# Patient Record
Sex: FEMALE
[De-identification: names, ages, dates, MRNs, and addresses within clinical notes are randomized per-mention and may not be internally consistent; named-entity substitution may affect disease eponyms.]

---

## 2020-10-07 PROBLEM — Z00.00 ENCOUNTER FOR PREVENTIVE HEALTH EXAMINATION: Status: ACTIVE | Noted: 2020-10-07

## 2020-10-30 ENCOUNTER — APPOINTMENT (OUTPATIENT)
Dept: VASCULAR SURGERY | Facility: CLINIC | Age: 85
End: 2020-10-30
Payer: COMMERCIAL

## 2020-10-30 VITALS
HEART RATE: 82 BPM | DIASTOLIC BLOOD PRESSURE: 83 MMHG | WEIGHT: 167 LBS | HEIGHT: 61 IN | SYSTOLIC BLOOD PRESSURE: 218 MMHG | BODY MASS INDEX: 31.53 KG/M2

## 2020-10-30 VITALS — TEMPERATURE: 97.1 F

## 2020-10-30 DIAGNOSIS — Z86.19 PERSONAL HISTORY OF OTHER INFECTIOUS AND PARASITIC DISEASES: ICD-10-CM

## 2020-10-30 DIAGNOSIS — Z78.9 OTHER SPECIFIED HEALTH STATUS: ICD-10-CM

## 2020-10-30 DIAGNOSIS — Z86.39 PERSONAL HISTORY OF OTHER ENDOCRINE, NUTRITIONAL AND METABOLIC DISEASE: ICD-10-CM

## 2020-10-30 DIAGNOSIS — Z87.19 PERSONAL HISTORY OF OTHER DISEASES OF THE DIGESTIVE SYSTEM: ICD-10-CM

## 2020-10-30 DIAGNOSIS — H40.9 UNSPECIFIED GLAUCOMA: ICD-10-CM

## 2020-10-30 DIAGNOSIS — Z87.448 PERSONAL HISTORY OF OTHER DISEASES OF URINARY SYSTEM: ICD-10-CM

## 2020-10-30 DIAGNOSIS — Z87.898 PERSONAL HISTORY OF OTHER SPECIFIED CONDITIONS: ICD-10-CM

## 2020-10-30 DIAGNOSIS — I10 ESSENTIAL (PRIMARY) HYPERTENSION: ICD-10-CM

## 2020-10-30 PROCEDURE — 99203 OFFICE O/P NEW LOW 30 MIN: CPT

## 2020-10-30 PROCEDURE — 93924 LWR XTR VASC STDY BILAT: CPT

## 2020-10-30 PROCEDURE — 99072 ADDL SUPL MATRL&STAF TM PHE: CPT

## 2020-10-30 RX ORDER — LATANOPROST/PF 0.005 %
0.01 DROPS OPHTHALMIC (EYE)
Refills: 0 | Status: ACTIVE | COMMUNITY

## 2020-10-30 RX ORDER — DORZOLAMIDE HYDROCHLORIDE TIMOLOL MALEATE 20; 5 MG/ML; MG/ML
SOLUTION/ DROPS OPHTHALMIC
Refills: 0 | Status: ACTIVE | COMMUNITY

## 2020-10-30 RX ORDER — AMLODIPINE BESYLATE 5 MG/1
TABLET ORAL
Refills: 0 | Status: ACTIVE | COMMUNITY

## 2020-10-30 RX ORDER — CLOPIDOGREL BISULFATE 75 MG/1
75 TABLET, FILM COATED ORAL DAILY
Qty: 90 | Refills: 3 | Status: ACTIVE | COMMUNITY
Start: 2020-10-30 | End: 1900-01-01

## 2020-10-30 RX ORDER — ATORVASTATIN CALCIUM 80 MG/1
TABLET, FILM COATED ORAL
Refills: 0 | Status: ACTIVE | COMMUNITY

## 2020-10-30 NOTE — PHYSICAL EXAM
[2+] : left 2+ [0] : left 0 [No Rash or Lesion] : No rash or lesion [Alert] : alert [Calm] : calm [JVD] : no jugular venous distention  [Normal Breath Sounds] : Normal breath sounds [Normal Heart Sounds] : normal heart sounds [Ankle Swelling (On Exam)] : not present [Varicose Veins Of Lower Extremities] : not present [] : not present [Abdomen Masses] : No abdominal masses [Skin Ulcer] : no ulcer [de-identified] : appears well  [de-identified] : no calf tenderness

## 2020-10-30 NOTE — ASSESSMENT
[FreeTextEntry1] : 83 yo female with a history of dm, htn, hld, presents for evaluation of b/l lower extremity pain and fatigue when walking.  Disabling claudication of less than half a block.\par \par james/pvr shows evidence of severe arterial disease\par \par will arrange for lower extremity angiogram

## 2020-10-30 NOTE — HISTORY OF PRESENT ILLNESS
[FreeTextEntry1] : 85 yo female with a history of dm, htn, hld, presents for evaluation of b/l lower extremity pain and fatigue when walking.  pt states that she is able to walk about 1 block or 1 flight of stairs before she has to stop and rest secondary to lower extremity fatigue and sob.  pt denies any history of open wounds of the lower extremities.  pt states that she occasionally experiences cramping pain in the legs that wake her at night.  pt states that she has been experiencing these symptoms over the past year but have been worsening in severity

## 2020-10-30 NOTE — REVIEW OF SYSTEMS
[SOB on Exertion] : shortness of breath during exertion [As Noted in HPI] : as noted in HPI [Negative] : Neurological [Chest Pain] : no chest pain

## 2020-10-30 NOTE — CONSULT LETTER
[Dear  ___] : Dear  [unfilled], [Consult Letter:] : I had the pleasure of evaluating your patient, [unfilled]. [Please see my note below.] : Please see my note below. [Consult Closing:] : Thank you very much for allowing me to participate in the care of this patient.  If you have any questions, please do not hesitate to contact me. [Sincerely,] : Sincerely, [FreeTextEntry3] : Ed Musa M.D., F.PANCHITO., R.P.JOANNAI.  of Vascular Surgery Assistant Professor of Radiology Director of Endovascular Program/ [facility]

## 2020-11-08 DIAGNOSIS — Z01.818 ENCOUNTER FOR OTHER PREPROCEDURAL EXAMINATION: ICD-10-CM

## 2020-11-13 ENCOUNTER — APPOINTMENT (OUTPATIENT)
Dept: DISASTER EMERGENCY | Facility: CLINIC | Age: 85
End: 2020-11-13

## 2020-11-14 LAB — SARS-COV-2 N GENE NPH QL NAA+PROBE: NOT DETECTED

## 2020-11-17 ENCOUNTER — LABORATORY RESULT (OUTPATIENT)
Age: 85
End: 2020-11-17

## 2020-11-18 ENCOUNTER — RESULT REVIEW (OUTPATIENT)
Age: 85
End: 2020-11-18

## 2020-11-18 ENCOUNTER — APPOINTMENT (OUTPATIENT)
Dept: ENDOVASCULAR SURGERY | Facility: CLINIC | Age: 85
End: 2020-11-18
Payer: COMMERCIAL

## 2020-11-18 VITALS
WEIGHT: 167 LBS | RESPIRATION RATE: 16 BRPM | HEIGHT: 61 IN | OXYGEN SATURATION: 97 % | DIASTOLIC BLOOD PRESSURE: 77 MMHG | HEART RATE: 65 BPM | TEMPERATURE: 97.7 F | BODY MASS INDEX: 31.53 KG/M2 | SYSTOLIC BLOOD PRESSURE: 179 MMHG

## 2020-11-18 PROCEDURE — 37231Z: CUSTOM | Mod: 59,LT

## 2020-11-18 PROCEDURE — 37227Z: CUSTOM | Mod: LT

## 2020-11-18 PROCEDURE — 75625 CONTRAST EXAM ABDOMINL AORTA: CPT

## 2020-11-18 PROCEDURE — 37233Z: CUSTOM | Mod: 59,LT

## 2020-11-18 RX ORDER — LOSARTAN POTASSIUM 100 MG/1
100 TABLET, FILM COATED ORAL
Refills: 0 | Status: ACTIVE | COMMUNITY

## 2020-11-30 NOTE — PHYSICAL EXAM
[Normal Breath Sounds] : Normal breath sounds [Normal Heart Sounds] : normal heart sounds [2+] : left 2+ [0] : left 0 [No Rash or Lesion] : No rash or lesion [Alert] : alert [Calm] : calm [JVD] : no jugular venous distention  [Ankle Swelling (On Exam)] : not present [Varicose Veins Of Lower Extremities] : not present [] : not present [Abdomen Masses] : No abdominal masses [Skin Ulcer] : no ulcer [de-identified] : appears well  [de-identified] : no calf tenderness

## 2020-11-30 NOTE — PROCEDURE
[D/C IV on discharge] : D/C IV on discharge [Resume diet] : resume diet [FreeTextEntry1] : aortogram, right leg angiogram,ahterectomy, stent and angioplasty

## 2020-11-30 NOTE — PAST MEDICAL HISTORY
[Increasing age ( >40 years old)] : Increasing age ( >40 years old) [No therapy indicated for cases scheduled for less than one hour] : No therapy indicated for cases scheduled for less than one hour. [FreeTextEntry1] : Malignant Hyperthermia Screening Tool and Risk of Bleeding Assessment\par \par Ms. EMILY SCHULTE denies family history of unexpected death following Anesthesia or Exercise.\par Denies Family history of Malignant Hyperthermia, Muscle or Neuromuscular disorder and High Temperature following exercise.\par \par Ms. EMILY SCHULTE denies history of Muscle Spasm, Dark or Chocolate - Colored urine and Unanticipated fever immediately following anesthesia or serious exercise. \par Ms. SCHULTE also denies bleeding tendencies/ Risks of Bleeding.\par

## 2020-11-30 NOTE — HISTORY OF PRESENT ILLNESS
[FreeTextEntry1] : accompanied by friend Simon Enamorado [phone]\par covid not detected 11/13/2020\par Cr: 1.09 10/30/2020\par feels ok\par took plavix, aspirin and BP meds this morning [FreeTextEntry6] : Dr. Chapman

## 2020-12-04 ENCOUNTER — APPOINTMENT (OUTPATIENT)
Dept: VASCULAR SURGERY | Facility: CLINIC | Age: 85
End: 2020-12-04
Payer: COMMERCIAL

## 2020-12-04 VITALS — TEMPERATURE: 94.6 F

## 2020-12-04 PROCEDURE — 93926 LOWER EXTREMITY STUDY: CPT

## 2020-12-04 PROCEDURE — 99072 ADDL SUPL MATRL&STAF TM PHE: CPT

## 2020-12-04 PROCEDURE — 99213 OFFICE O/P EST LOW 20 MIN: CPT

## 2020-12-04 NOTE — ASSESSMENT
[FreeTextEntry1] : Patient with peripheral vascular disease.  Left SFA and popliteal stents are patent.  Left peroneal artery proximally is not visualized.  Continue antiplatelet therapy.  Follow-up next week for PVRs to further evaluate.

## 2020-12-04 NOTE — HISTORY OF PRESENT ILLNESS
[FreeTextEntry1] : Patient with peripheral vascular disease.  Status post left SFA and popliteal stent placement.  Patient reports swelling of the left leg .  No tissue loss.  Patient continues to have complains of left leg pain.  Patient believes right lower extremity symptoms have improved.

## 2020-12-11 ENCOUNTER — APPOINTMENT (OUTPATIENT)
Dept: VASCULAR SURGERY | Facility: CLINIC | Age: 85
End: 2020-12-11
Payer: MEDICARE

## 2020-12-11 PROCEDURE — 99213 OFFICE O/P EST LOW 20 MIN: CPT

## 2020-12-11 PROCEDURE — 93923 UPR/LXTR ART STDY 3+ LVLS: CPT

## 2020-12-11 PROCEDURE — 99072 ADDL SUPL MATRL&STAF TM PHE: CPT

## 2020-12-11 NOTE — HISTORY OF PRESENT ILLNESS
[FreeTextEntry1] : Patient with peripheral vascular disease.  Status post left SFA and popliteal stent placement.  Patient reports swelling of the left leg .  No tissue loss.  No complaint of left leg rest pain or claudication.\par \par Patient complains of less than 1 block right leg claudication.

## 2020-12-11 NOTE — PHYSICAL EXAM
[JVD] : no jugular venous distention  [Normal Breath Sounds] : Normal breath sounds [Normal Heart Sounds] : normal heart sounds [2+] : left 2+ [0] : left 0 [Ankle Swelling (On Exam)] : not present [Varicose Veins Of Lower Extremities] : not present [] : not present [Abdomen Masses] : No abdominal masses [No Rash or Lesion] : No rash or lesion [Skin Ulcer] : no ulcer [Alert] : alert [Calm] : calm [de-identified] : appears well  [de-identified] : no calf tenderness

## 2020-12-11 NOTE — ASSESSMENT
[FreeTextEntry1] : Patient with peripheral vascular disease.  Left SFA and popliteal stents are patent.  Left leg feels much better.  Plan for right leg angiogram and intervention

## 2020-12-26 ENCOUNTER — APPOINTMENT (OUTPATIENT)
Dept: DISASTER EMERGENCY | Facility: CLINIC | Age: 85
End: 2020-12-26

## 2020-12-26 LAB — SARS-COV-2 N GENE NPH QL NAA+PROBE: NOT DETECTED

## 2020-12-27 ENCOUNTER — APPOINTMENT (OUTPATIENT)
Dept: DISASTER EMERGENCY | Facility: CLINIC | Age: 85
End: 2020-12-27

## 2020-12-27 DIAGNOSIS — Z01.818 ENCOUNTER FOR OTHER PREPROCEDURAL EXAMINATION: ICD-10-CM

## 2020-12-30 ENCOUNTER — RESULT REVIEW (OUTPATIENT)
Age: 85
End: 2020-12-30

## 2020-12-30 ENCOUNTER — LABORATORY RESULT (OUTPATIENT)
Age: 85
End: 2020-12-30

## 2020-12-30 ENCOUNTER — APPOINTMENT (OUTPATIENT)
Dept: ENDOVASCULAR SURGERY | Facility: CLINIC | Age: 85
End: 2020-12-30
Payer: COMMERCIAL

## 2020-12-30 VITALS
RESPIRATION RATE: 16 BRPM | TEMPERATURE: 98.2 F | HEART RATE: 65 BPM | DIASTOLIC BLOOD PRESSURE: 80 MMHG | SYSTOLIC BLOOD PRESSURE: 202 MMHG | OXYGEN SATURATION: 100 % | BODY MASS INDEX: 31.53 KG/M2 | WEIGHT: 167 LBS | HEIGHT: 61 IN

## 2020-12-30 PROCEDURE — 37197Z: CUSTOM | Mod: 59,RT

## 2020-12-30 PROCEDURE — 75625 CONTRAST EXAM ABDOMINL AORTA: CPT

## 2020-12-30 PROCEDURE — 99072 ADDL SUPL MATRL&STAF TM PHE: CPT

## 2020-12-30 PROCEDURE — 37227Z: CUSTOM | Mod: RT

## 2021-01-11 ENCOUNTER — APPOINTMENT (OUTPATIENT)
Dept: VASCULAR SURGERY | Facility: CLINIC | Age: 86
End: 2021-01-11
Payer: COMMERCIAL

## 2021-01-11 VITALS — TEMPERATURE: 96.8 F

## 2021-01-11 PROCEDURE — 99072 ADDL SUPL MATRL&STAF TM PHE: CPT

## 2021-01-11 PROCEDURE — 99213 OFFICE O/P EST LOW 20 MIN: CPT

## 2021-01-11 PROCEDURE — 93926 LOWER EXTREMITY STUDY: CPT

## 2021-01-11 NOTE — HISTORY OF PRESENT ILLNESS
[FreeTextEntry1] : 84 yo female with a history of dm, htn, hld, presents for follow up of b/l lower extremity pain and fatigue when walking s/p left lower extremity angiogram with atherectomy and stent placement of the left sfa and pop s/p recent right lower extremity sfa and pop stent. pt reports right lower extremity edema with mild right calf pain \par

## 2021-01-11 NOTE — PHYSICAL EXAM
[No Rash or Lesion] : No rash or lesion [Alert] : alert [Calm] : calm [JVD] : no jugular venous distention  [Normal Breath Sounds] : Normal breath sounds [Normal Heart Sounds] : normal heart sounds [2+] : left 2+ [0] : left 0 [Ankle Swelling (On Exam)] : not present [Varicose Veins Of Lower Extremities] : not present [] : not present [Abdomen Masses] : No abdominal masses [Skin Ulcer] : no ulcer [de-identified] : appears well  [de-identified] : no calf tenderness

## 2021-01-11 NOTE — ASSESSMENT
[FreeTextEntry1] : 86 yo female with a history of dm, htn, hld, presents for follow up of b/l lower extremity pain and fatigue when walking s/p left lower extremity angiogram with atherectomy and stent placement of the left sfa and pop s/p recent right lower extremity sfa and pop stent.\par \par right sfa - pop stent patent with 20-50% stenosis of the distal pop and tibial peroneal trunk \par \par venous duplex shows no evidence of dvt/svt \par \par pt to elevate her lower extremities and encouraged exercise \par pt to continue with plavix and will follow up in 3 months with b/l stent duplex \par \par

## 2021-01-20 NOTE — PROCEDURE
[Groin check for bleeding/hematoma, vitals checked, and Doppler/pulse checked on admission, then every 15 minutes for an hour and every 30 minutes for 3 hours thereafter.] : Groin check for bleeding/hematoma, vitals checked, and Doppler/pulse checked on admission, then every 15 minutes for an hour and every 30 minutes for 3 hours thereafter.  [Keep flat for 2 hours, then elevate head gradually as tolerated] : Keep flat for 2 hours, then elevate head gradually as tolerated [Bed rest for 3.5 hours, then ambulate and observe site for bleeding] : Bed rest for 3.5 hours, then ambulate and observe site for bleeding [Resume Diet] : resume diet [D/C IV on discharge] : D/C IV on discharge [Resume diet] : resume diet [FreeTextEntry1] : aortogram,right leg angiogram/athrectomy/angioplasty

## 2021-01-20 NOTE — PHYSICAL EXAM
[Normal Breath Sounds] : Normal breath sounds [Normal Heart Sounds] : normal heart sounds [2+] : left 2+ [0] : left 0 [No Rash or Lesion] : No rash or lesion [Alert] : alert [Calm] : calm [JVD] : no jugular venous distention  [Ankle Swelling (On Exam)] : not present [Varicose Veins Of Lower Extremities] : not present [] : not present [Abdomen Masses] : No abdominal masses [Skin Ulcer] : no ulcer [de-identified] : appears well  [de-identified] : no calf tenderness

## 2021-01-20 NOTE — HISTORY OF PRESENT ILLNESS
[FreeTextEntry1] : accompanied by friend Simon Enamorado [phone]\par covid not detected 12/26//2020\par no reported falls\par feels ok\par took plavix, aspirin and BP meds this morning\par Cr = 1.33 12/11/2020 [FreeTextEntry5] : 12/29/2020 10pm [FreeTextEntry6] : Dr. Chapman

## 2021-04-23 ENCOUNTER — APPOINTMENT (OUTPATIENT)
Dept: VASCULAR SURGERY | Facility: CLINIC | Age: 86
End: 2021-04-23
Payer: COMMERCIAL

## 2021-04-23 VITALS — TEMPERATURE: 97.1 F

## 2021-04-23 PROCEDURE — 99213 OFFICE O/P EST LOW 20 MIN: CPT

## 2021-04-23 PROCEDURE — 93925 LOWER EXTREMITY STUDY: CPT

## 2021-04-23 PROCEDURE — 99072 ADDL SUPL MATRL&STAF TM PHE: CPT

## 2021-04-23 NOTE — PHYSICAL EXAM
[JVD] : no jugular venous distention  [Normal Breath Sounds] : Normal breath sounds [Normal Heart Sounds] : normal heart sounds [2+] : left 2+ [0] : left 0 [Ankle Swelling (On Exam)] : not present [Varicose Veins Of Lower Extremities] : not present [] : not present [Abdomen Masses] : No abdominal masses [No Rash or Lesion] : No rash or lesion [Skin Ulcer] : no ulcer [Alert] : alert [Calm] : calm [de-identified] : appears well  [de-identified] : no calf tenderness

## 2021-04-23 NOTE — HISTORY OF PRESENT ILLNESS
[FreeTextEntry1] : Patient with history of severe peripheral vascular disease, status post bilateral SFA and popliteal artery stent placement.  Patient reports significant improvement of heaviness and tightness of both legs.  Patient currently is suffering from significant shortness of breath.  Undergoing work-up for cardiology including stress test next week.  Patient currently on antiplatelet therapy.  No rest pain or tissue loss.

## 2021-04-23 NOTE — ASSESSMENT
[FreeTextEntry1] : Peripheral vascular disease, status post bilateral SFA and popliteal stent placement.  No limb threatening ischemia at this time.  Patient has severe stenosis of both lower extremity stents.  I would like to observe the patient on antiplatelet therapy while the cardiology work-up is completed.  Follow-up in 1 month for repeat duplex.

## 2021-04-29 ENCOUNTER — OUTPATIENT (OUTPATIENT)
Dept: OUTPATIENT SERVICES | Facility: HOSPITAL | Age: 86
LOS: 1 days | End: 2021-04-29
Payer: COMMERCIAL

## 2021-04-29 DIAGNOSIS — R06.00 DYSPNEA, UNSPECIFIED: ICD-10-CM

## 2021-04-29 PROCEDURE — 78452 HT MUSCLE IMAGE SPECT MULT: CPT | Mod: 26

## 2021-04-29 PROCEDURE — 93018 CV STRESS TEST I&R ONLY: CPT

## 2021-04-29 PROCEDURE — 93016 CV STRESS TEST SUPVJ ONLY: CPT

## 2021-05-03 PROCEDURE — A9502: CPT

## 2021-05-03 PROCEDURE — 78452 HT MUSCLE IMAGE SPECT MULT: CPT

## 2021-05-03 PROCEDURE — 93017 CV STRESS TEST TRACING ONLY: CPT

## 2021-05-04 LAB
ALBUMIN SERPL ELPH-MCNC: 4.3 G/DL
ALBUMIN SERPL ELPH-MCNC: 4.5 G/DL
ALP BLD-CCNC: 63 U/L
ALP BLD-CCNC: 67 U/L
ALT SERPL-CCNC: 12 U/L
ALT SERPL-CCNC: 13 U/L
ANION GAP SERPL CALC-SCNC: 12 MMOL/L
ANION GAP SERPL CALC-SCNC: 13 MMOL/L
APTT BLD: 29.9 SEC
APTT BLD: 30.8 SEC
AST SERPL-CCNC: 20 U/L
AST SERPL-CCNC: 22 U/L
BASOPHILS # BLD AUTO: 0.04 K/UL
BASOPHILS # BLD AUTO: 0.05 K/UL
BASOPHILS NFR BLD AUTO: 0.6 %
BASOPHILS NFR BLD AUTO: 0.8 %
BILIRUB SERPL-MCNC: 0.6 MG/DL
BILIRUB SERPL-MCNC: 0.8 MG/DL
BUN SERPL-MCNC: 16 MG/DL
BUN SERPL-MCNC: 17 MG/DL
CALCIUM SERPL-MCNC: 10 MG/DL
CALCIUM SERPL-MCNC: 10 MG/DL
CHLORIDE SERPL-SCNC: 101 MMOL/L
CHLORIDE SERPL-SCNC: 102 MMOL/L
CO2 SERPL-SCNC: 28 MMOL/L
CO2 SERPL-SCNC: 28 MMOL/L
CREAT SERPL-MCNC: 1.09 MG/DL
CREAT SERPL-MCNC: 1.33 MG/DL
EOSINOPHIL # BLD AUTO: 0.19 K/UL
EOSINOPHIL # BLD AUTO: 0.19 K/UL
EOSINOPHIL NFR BLD AUTO: 2.8 %
EOSINOPHIL NFR BLD AUTO: 3.1 %
GLUCOSE SERPL-MCNC: 116 MG/DL
GLUCOSE SERPL-MCNC: 93 MG/DL
HCT VFR BLD CALC: 40.8 %
HCT VFR BLD CALC: 41.6 %
HGB BLD-MCNC: 12.2 G/DL
HGB BLD-MCNC: 12.3 G/DL
IMM GRANULOCYTES NFR BLD AUTO: 0.2 %
IMM GRANULOCYTES NFR BLD AUTO: 0.3 %
INR PPP: 1.12 RATIO
INR PPP: 1.2 RATIO
LYMPHOCYTES # BLD AUTO: 2.31 K/UL
LYMPHOCYTES # BLD AUTO: 2.48 K/UL
LYMPHOCYTES NFR BLD AUTO: 33.6 %
LYMPHOCYTES NFR BLD AUTO: 40.1 %
MAN DIFF?: NORMAL
MAN DIFF?: NORMAL
MCHC RBC-ENTMCNC: 26.5 PG
MCHC RBC-ENTMCNC: 27 PG
MCHC RBC-ENTMCNC: 29.6 GM/DL
MCHC RBC-ENTMCNC: 29.9 GM/DL
MCV RBC AUTO: 89.7 FL
MCV RBC AUTO: 90.3 FL
MONOCYTES # BLD AUTO: 0.5 K/UL
MONOCYTES # BLD AUTO: 0.61 K/UL
MONOCYTES NFR BLD AUTO: 7.3 %
MONOCYTES NFR BLD AUTO: 9.9 %
NEUTROPHILS # BLD AUTO: 2.84 K/UL
NEUTROPHILS # BLD AUTO: 3.82 K/UL
NEUTROPHILS NFR BLD AUTO: 45.9 %
NEUTROPHILS NFR BLD AUTO: 55.4 %
PLATELET # BLD AUTO: 277 K/UL
PLATELET # BLD AUTO: 319 K/UL
POTASSIUM SERPL-SCNC: 3.9 MMOL/L
POTASSIUM SERPL-SCNC: 5.4 MMOL/L
PROT SERPL-MCNC: 7.6 G/DL
PROT SERPL-MCNC: 7.7 G/DL
PT BLD: 13.2 SEC
PT BLD: 14 SEC
RBC # BLD: 4.52 M/UL
RBC # BLD: 4.64 M/UL
RBC # FLD: 14.6 %
RBC # FLD: 15.2 %
SODIUM SERPL-SCNC: 142 MMOL/L
SODIUM SERPL-SCNC: 143 MMOL/L
WBC # FLD AUTO: 6.18 K/UL
WBC # FLD AUTO: 6.88 K/UL

## 2021-05-28 ENCOUNTER — APPOINTMENT (OUTPATIENT)
Dept: VASCULAR SURGERY | Facility: CLINIC | Age: 86
End: 2021-05-28
Payer: COMMERCIAL

## 2021-05-28 VITALS — TEMPERATURE: 96.8 F

## 2021-05-28 PROCEDURE — 93925 LOWER EXTREMITY STUDY: CPT

## 2021-06-04 ENCOUNTER — APPOINTMENT (OUTPATIENT)
Dept: VASCULAR SURGERY | Facility: CLINIC | Age: 86
End: 2021-06-04
Payer: COMMERCIAL

## 2021-06-04 LAB
ANION GAP SERPL CALC-SCNC: 13 MMOL/L
APTT BLD: 31.3 SEC
BASOPHILS # BLD AUTO: 0.04 K/UL
BASOPHILS NFR BLD AUTO: 0.6 %
BUN SERPL-MCNC: 13 MG/DL
CALCIUM SERPL-MCNC: 9.7 MG/DL
CHLORIDE SERPL-SCNC: 102 MMOL/L
CO2 SERPL-SCNC: 28 MMOL/L
CREAT SERPL-MCNC: 1.14 MG/DL
EOSINOPHIL # BLD AUTO: 0.45 K/UL
EOSINOPHIL NFR BLD AUTO: 6.4 %
GLUCOSE SERPL-MCNC: 164 MG/DL
HCT VFR BLD CALC: 40.1 %
HGB BLD-MCNC: 12 G/DL
IMM GRANULOCYTES NFR BLD AUTO: 0.1 %
INR PPP: 1.22 RATIO
LYMPHOCYTES # BLD AUTO: 1.81 K/UL
LYMPHOCYTES NFR BLD AUTO: 25.8 %
MAN DIFF?: NORMAL
MCHC RBC-ENTMCNC: 26.4 PG
MCHC RBC-ENTMCNC: 29.9 GM/DL
MCV RBC AUTO: 88.1 FL
MONOCYTES # BLD AUTO: 0.65 K/UL
MONOCYTES NFR BLD AUTO: 9.3 %
NEUTROPHILS # BLD AUTO: 4.06 K/UL
NEUTROPHILS NFR BLD AUTO: 57.8 %
PLATELET # BLD AUTO: 334 K/UL
POTASSIUM SERPL-SCNC: 5.3 MMOL/L
PT BLD: 14.3 SEC
RBC # BLD: 4.55 M/UL
RBC # FLD: 15.1 %
SODIUM SERPL-SCNC: 144 MMOL/L
WBC # FLD AUTO: 7.02 K/UL

## 2021-06-04 PROCEDURE — 99213 OFFICE O/P EST LOW 20 MIN: CPT

## 2021-06-04 PROCEDURE — 93923 UPR/LXTR ART STDY 3+ LVLS: CPT

## 2021-06-04 NOTE — ASSESSMENT
[FreeTextEntry1] : 84 yo female with history of dm, hld, pad s/p b/l pop stents presents for follow up with continued 1/3 block claudication unchanged from last week \par \par james 0.5 bilateral lower extremities with dampened b/l distal waveforms \par \par will schedule for bilateral lower extremity angiogram in the next 1-2 weeks \par \par pt given request for blood work

## 2021-06-04 NOTE — HISTORY OF PRESENT ILLNESS
[FreeTextEntry1] : 84 yo female with history of dm, hld, pad s/p b/l pop stents presents for follow up with continued 1/3 block claudication unchanged from last week \par stress test report received and shows no reservable ischemia \par pt has follow up with cardiology on monday

## 2021-06-04 NOTE — PHYSICAL EXAM
[0] : left 0 [No Rash or Lesion] : No rash or lesion [Alert] : alert [Calm] : calm [JVD] : no jugular venous distention  [Normal Breath Sounds] : Normal breath sounds [Normal Heart Sounds] : normal heart sounds [Ankle Swelling (On Exam)] : not present [Varicose Veins Of Lower Extremities] : not present [] : not present [Skin Ulcer] : no ulcer [de-identified] : appears well

## 2021-06-09 PROBLEM — R06.00 DYSPNEA ON EXERTION: Status: ACTIVE | Noted: 2021-06-09

## 2021-06-11 ENCOUNTER — APPOINTMENT (OUTPATIENT)
Dept: ENDOVASCULAR SURGERY | Facility: CLINIC | Age: 86
End: 2021-06-11
Payer: COMMERCIAL

## 2021-06-11 ENCOUNTER — LABORATORY RESULT (OUTPATIENT)
Age: 86
End: 2021-06-11

## 2021-06-11 ENCOUNTER — RESULT REVIEW (OUTPATIENT)
Age: 86
End: 2021-06-11

## 2021-06-11 VITALS
TEMPERATURE: 97.8 F | HEIGHT: 61 IN | SYSTOLIC BLOOD PRESSURE: 198 MMHG | BODY MASS INDEX: 31.53 KG/M2 | DIASTOLIC BLOOD PRESSURE: 83 MMHG | RESPIRATION RATE: 16 BRPM | OXYGEN SATURATION: 98 % | HEART RATE: 63 BPM | WEIGHT: 167 LBS

## 2021-06-11 DIAGNOSIS — R06.00 DYSPNEA, UNSPECIFIED: ICD-10-CM

## 2021-06-11 PROCEDURE — 75625 CONTRAST EXAM ABDOMINL AORTA: CPT

## 2021-06-11 PROCEDURE — 37225Z: CUSTOM | Mod: RT

## 2021-06-11 NOTE — ASSESSMENT
[FreeTextEntry1] : 84 yo female with history of dm, hld, pad s/p b/l pop stents presents for follow up with continued 1/3 block claudication unchanged from last week \par \par james 0.5 bilateral lower extremities with dampened b/l distal waveforms . plan for angiogram and possible intervention\par

## 2021-06-11 NOTE — HISTORY OF PRESENT ILLNESS
[FreeTextEntry1] : accompanied by friend\par fully vaccinated for covid \par Cr 1.14 6/4/2021\par alert and oriented\par no reported falls\par took BP meds this Am [FreeTextEntry5] : 11pm last night [FreeTextEntry6] : Dr. Baldwin

## 2021-06-11 NOTE — PAST MEDICAL HISTORY
[Increasing age ( >40 years old)] : Increasing age ( >40 years old) [No therapy indicated for cases scheduled for less than one hour] : No therapy indicated for cases scheduled for less than one hour. [FreeTextEntry1] : Malignant Hyperthermia Screening Tool and Risk of Bleeding Assessment\par Ms. EMILY SCHULTE denies family history of unexpected death following Anesthesia or Exercise.\par Denies Family history of Malignant Hyperthermia, Muscle or Neuromuscular disorder and High Temperature following exercise.\par \par Ms. EMILY SCHULTE denies history of Muscle Spasm, Dark or Chocolate - Colored urine and Unanticipated fever immediately following anesthesia or serious exercise. \par Ms. SCHULTE also denies bleeding tendencies/ Risks of Bleeding.\par

## 2021-06-11 NOTE — PROCEDURE
[D/C IV on discharge] : D/C IV on discharge [Resume diet] : resume diet [FreeTextEntry1] : aortogram, right leg angiogram/athrectomy/angioplasty

## 2021-06-18 ENCOUNTER — LABORATORY RESULT (OUTPATIENT)
Age: 86
End: 2021-06-18

## 2021-06-18 ENCOUNTER — RESULT REVIEW (OUTPATIENT)
Age: 86
End: 2021-06-18

## 2021-06-18 ENCOUNTER — APPOINTMENT (OUTPATIENT)
Dept: ENDOVASCULAR SURGERY | Facility: CLINIC | Age: 86
End: 2021-06-18
Payer: COMMERCIAL

## 2021-06-18 VITALS
RESPIRATION RATE: 16 BRPM | BODY MASS INDEX: 31.53 KG/M2 | OXYGEN SATURATION: 99 % | TEMPERATURE: 98.2 F | HEIGHT: 61 IN | DIASTOLIC BLOOD PRESSURE: 72 MMHG | WEIGHT: 167 LBS | HEART RATE: 82 BPM | SYSTOLIC BLOOD PRESSURE: 184 MMHG

## 2021-06-18 PROCEDURE — 37225Z: CUSTOM

## 2021-06-18 PROCEDURE — 75625 CONTRAST EXAM ABDOMINL AORTA: CPT

## 2021-06-18 PROCEDURE — 37229Z: CUSTOM | Mod: 59,LT

## 2021-06-18 RX ORDER — HYDROCHLOROTHIAZIDE 12.5 MG/1
12.5 CAPSULE ORAL
Refills: 0 | Status: DISCONTINUED | COMMUNITY
End: 2021-06-18

## 2021-06-18 NOTE — PROCEDURE
[D/C IV on discharge] : D/C IV on discharge [Resume diet] : resume diet [FreeTextEntry1] : aortogram, left leg angiogram/athrectomy/angioplasty

## 2021-06-18 NOTE — HISTORY OF PRESENT ILLNESS
[FreeTextEntry1] : accompanied by friend Surya [phone]\par fully vaccinated for covid \par Cr 1.1 6/18/2021\par alert and oriented\par no reported falls\par took BP meds this Am\par took aspirin/plavix [FreeTextEntry5] : 930pm last night [FreeTextEntry6] : Dr. Chapman

## 2021-06-18 NOTE — PHYSICAL EXAM
[Normal Breath Sounds] : Normal breath sounds [Normal Heart Sounds] : normal heart sounds [0] : left 0 [No Rash or Lesion] : No rash or lesion [Alert] : alert [Calm] : calm [JVD] : no jugular venous distention  [Ankle Swelling (On Exam)] : not present [Varicose Veins Of Lower Extremities] : not present [] : not present [Skin Ulcer] : no ulcer [de-identified] : appears well

## 2021-06-18 NOTE — ASSESSMENT
[FreeTextEntry1] : 86 yo female with history of dm, hld, pad s/p b/l pop stents  with continued 1/3 block claudication unchanged from last week \par \par james 0.5 bilateral lower extremities with dampened b/l distal waveforms . plan for angiogram and possible intervention\par

## 2021-07-02 ENCOUNTER — APPOINTMENT (OUTPATIENT)
Dept: VASCULAR SURGERY | Facility: CLINIC | Age: 86
End: 2021-07-02
Payer: MEDICARE

## 2021-07-02 PROCEDURE — 99213 OFFICE O/P EST LOW 20 MIN: CPT

## 2021-07-02 PROCEDURE — 93925 LOWER EXTREMITY STUDY: CPT

## 2021-07-02 NOTE — HISTORY OF PRESENT ILLNESS
[FreeTextEntry1] : 84 yo female with a history of dm, htn, hld, presents for follow up of b/l lower extremity status post bilateral lower extremity intervention.  Patient has no complains of rest pain with minimal claudication symptoms.

## 2021-07-02 NOTE — ASSESSMENT
[FreeTextEntry1] : 84 yo female with a history of dm, htn, hld, presents for follow up of b/l lower extremity status post bilateral lower extremity interventions.  Stents widely patent.\par pt to continue with plavix and will follow up in 3 months with b/l stent duplex \par \par

## 2021-07-02 NOTE — PHYSICAL EXAM
[JVD] : no jugular venous distention  [Normal Breath Sounds] : Normal breath sounds [Normal Heart Sounds] : normal heart sounds [2+] : left 2+ [0] : left 0 [Ankle Swelling (On Exam)] : not present [Varicose Veins Of Lower Extremities] : not present [] : not present [Abdomen Masses] : No abdominal masses [No Rash or Lesion] : No rash or lesion [Skin Ulcer] : no ulcer [Alert] : alert [Calm] : calm [de-identified] : appears well  [de-identified] : no calf tenderness

## 2021-07-06 ENCOUNTER — APPOINTMENT (OUTPATIENT)
Dept: VASCULAR SURGERY | Facility: CLINIC | Age: 86
End: 2021-07-06

## 2021-10-08 ENCOUNTER — APPOINTMENT (OUTPATIENT)
Dept: VASCULAR SURGERY | Facility: CLINIC | Age: 86
End: 2021-10-08
Payer: MEDICARE

## 2021-10-08 VITALS — TEMPERATURE: 96.9 F

## 2021-10-08 PROCEDURE — 93925 LOWER EXTREMITY STUDY: CPT

## 2021-10-08 PROCEDURE — 99213 OFFICE O/P EST LOW 20 MIN: CPT

## 2021-10-08 NOTE — PHYSICAL EXAM
[JVD] : no jugular venous distention  [Normal Breath Sounds] : Normal breath sounds [Normal Heart Sounds] : normal heart sounds [2+] : left 2+ [0] : left 0 [Ankle Swelling (On Exam)] : not present [Varicose Veins Of Lower Extremities] : not present [] : not present [Abdomen Masses] : No abdominal masses [No Rash or Lesion] : No rash or lesion [Skin Ulcer] : no ulcer [Alert] : alert [Calm] : calm [de-identified] : appears well  [de-identified] : no calf tenderness

## 2022-01-28 ENCOUNTER — APPOINTMENT (OUTPATIENT)
Dept: VASCULAR SURGERY | Facility: CLINIC | Age: 87
End: 2022-01-28
Payer: MEDICARE

## 2022-01-28 VITALS
BODY MASS INDEX: 31.53 KG/M2 | SYSTOLIC BLOOD PRESSURE: 190 MMHG | HEART RATE: 91 BPM | HEIGHT: 61 IN | DIASTOLIC BLOOD PRESSURE: 70 MMHG | WEIGHT: 167 LBS

## 2022-01-28 LAB
ANION GAP SERPL CALC-SCNC: 12 MMOL/L
BASOPHILS # BLD AUTO: 0.05 K/UL
BASOPHILS NFR BLD AUTO: 0.8 %
BUN SERPL-MCNC: 24 MG/DL
CALCIUM SERPL-MCNC: 9.8 MG/DL
CHLORIDE SERPL-SCNC: 103 MMOL/L
CO2 SERPL-SCNC: 27 MMOL/L
CREAT SERPL-MCNC: 1.58 MG/DL
EOSINOPHIL # BLD AUTO: 0.2 K/UL
EOSINOPHIL NFR BLD AUTO: 3.2 %
GLUCOSE SERPL-MCNC: 129 MG/DL
HCT VFR BLD CALC: 37.2 %
HGB BLD-MCNC: 11.3 G/DL
IMM GRANULOCYTES NFR BLD AUTO: 0.3 %
LYMPHOCYTES # BLD AUTO: 1.98 K/UL
LYMPHOCYTES NFR BLD AUTO: 32 %
MAN DIFF?: NORMAL
MCHC RBC-ENTMCNC: 26.8 PG
MCHC RBC-ENTMCNC: 30.4 GM/DL
MCV RBC AUTO: 88.2 FL
MONOCYTES # BLD AUTO: 0.84 K/UL
MONOCYTES NFR BLD AUTO: 13.6 %
NEUTROPHILS # BLD AUTO: 3.09 K/UL
NEUTROPHILS NFR BLD AUTO: 50.1 %
PLATELET # BLD AUTO: 267 K/UL
POTASSIUM SERPL-SCNC: 4.6 MMOL/L
RBC # BLD: 4.22 M/UL
RBC # FLD: 15.1 %
SODIUM SERPL-SCNC: 143 MMOL/L
WBC # FLD AUTO: 6.18 K/UL

## 2022-01-28 PROCEDURE — 93925 LOWER EXTREMITY STUDY: CPT

## 2022-01-29 LAB
APTT BLD: 30.2 SEC
INR PPP: 1.13 RATIO
PT BLD: 13.3 SEC

## 2022-02-15 ENCOUNTER — LABORATORY RESULT (OUTPATIENT)
Age: 87
End: 2022-02-15

## 2022-02-15 ENCOUNTER — NON-APPOINTMENT (OUTPATIENT)
Age: 87
End: 2022-02-15

## 2022-02-15 ENCOUNTER — APPOINTMENT (OUTPATIENT)
Dept: ENDOVASCULAR SURGERY | Facility: CLINIC | Age: 87
End: 2022-02-15
Payer: MEDICARE

## 2022-02-15 ENCOUNTER — RESULT REVIEW (OUTPATIENT)
Age: 87
End: 2022-02-15

## 2022-02-15 VITALS
OXYGEN SATURATION: 100 % | HEIGHT: 61 IN | SYSTOLIC BLOOD PRESSURE: 183 MMHG | RESPIRATION RATE: 20 BRPM | HEART RATE: 90 BPM | WEIGHT: 167 LBS | DIASTOLIC BLOOD PRESSURE: 71 MMHG | TEMPERATURE: 98.2 F | BODY MASS INDEX: 31.53 KG/M2

## 2022-02-15 PROCEDURE — 37229Z: CUSTOM | Mod: 82,59,LT

## 2022-02-15 PROCEDURE — 37225Z: CUSTOM | Mod: 82,LT

## 2022-02-15 PROCEDURE — 37225Z: CUSTOM | Mod: LT

## 2022-02-15 PROCEDURE — 37229Z: CUSTOM | Mod: 59,LT

## 2022-02-15 PROCEDURE — 75625 CONTRAST EXAM ABDOMINL AORTA: CPT

## 2022-02-15 RX ORDER — HYDRALAZINE HYDROCHLORIDE 25 MG/1
25 TABLET ORAL
Refills: 0 | Status: ACTIVE | COMMUNITY

## 2022-02-15 RX ORDER — EZETIMIBE 10 MG/1
10 TABLET ORAL
Refills: 0 | Status: ACTIVE | COMMUNITY

## 2022-02-15 NOTE — DISCUSSION/SUMMARY
[FreeTextEntry1] : pt without any complaints at this time currently on asa, plavix and statin presents for follow up \par denies any claudications, rest pain or wounds states that sob limits her walking \par duplex shows >75% in stent stenosis of the left mid to distal pop \par given severe stenosis would arrange for left lower extremity angiogram

## 2022-02-15 NOTE — PROCEDURE
[D/C IV on discharge] : D/C IV on discharge [Resume diet] : resume diet [FreeTextEntry1] : aortogram, left leg angiogram, angioplasty, atherectomy

## 2022-02-15 NOTE — ASSESSMENT
[FreeTextEntry1] : duplex shows >75% in stent stenosis of the left mid to distal pop \par given severe stenosis plan for left lower extremity angiogram \par

## 2022-02-15 NOTE — PHYSICAL EXAM
[Normal Breath Sounds] : Normal breath sounds [Normal Heart Sounds] : normal heart sounds [0] : left 0 [No Rash or Lesion] : No rash or lesion [Alert] : alert [Calm] : calm [JVD] : no jugular venous distention  [Ankle Swelling (On Exam)] : not present [Varicose Veins Of Lower Extremities] : not present [] : not present [Skin Ulcer] : no ulcer [de-identified] : appears well

## 2022-02-15 NOTE — HISTORY OF PRESENT ILLNESS
[FreeTextEntry1] : accompanied by friend Surya [phone]\par fully vaccinated for covid \par covid not detected 2/13/2022\par Cr 1.58 1/28/2022\par alert and oriented\par no reported falls\par took all meds except losartan this morning\par took aspirin/plavix [FreeTextEntry5] : 1030 last night [FreeTextEntry6] : Dr. Brewer

## 2022-03-30 ENCOUNTER — APPOINTMENT (OUTPATIENT)
Dept: VASCULAR SURGERY | Facility: CLINIC | Age: 87
End: 2022-03-30

## 2022-03-30 ENCOUNTER — APPOINTMENT (OUTPATIENT)
Dept: VASCULAR SURGERY | Facility: CLINIC | Age: 87
End: 2022-03-30
Payer: MEDICARE

## 2022-03-30 PROCEDURE — 93926 LOWER EXTREMITY STUDY: CPT

## 2022-06-17 ENCOUNTER — APPOINTMENT (OUTPATIENT)
Dept: VASCULAR SURGERY | Facility: CLINIC | Age: 87
End: 2022-06-17

## 2022-08-19 ENCOUNTER — APPOINTMENT (OUTPATIENT)
Dept: VASCULAR SURGERY | Facility: CLINIC | Age: 87
End: 2022-08-19

## 2022-08-19 VITALS
SYSTOLIC BLOOD PRESSURE: 217 MMHG | DIASTOLIC BLOOD PRESSURE: 71 MMHG | BODY MASS INDEX: 31.91 KG/M2 | HEART RATE: 85 BPM | HEIGHT: 61 IN | WEIGHT: 169 LBS

## 2022-08-19 DIAGNOSIS — I70.219 ATHEROSCLEROSIS OF NATIVE ARTERIES OF EXTREMITIES WITH INTERMITTENT CLAUDICATION, UNSPECIFIED EXTREMITY: ICD-10-CM

## 2022-08-19 LAB
ALBUMIN SERPL ELPH-MCNC: 4.2 G/DL
ALP BLD-CCNC: 54 U/L
ALT SERPL-CCNC: 13 U/L
ANION GAP SERPL CALC-SCNC: 12 MMOL/L
AST SERPL-CCNC: 19 U/L
BASOPHILS # BLD AUTO: 0.04 K/UL
BASOPHILS NFR BLD AUTO: 0.8 %
BILIRUB SERPL-MCNC: 0.5 MG/DL
BUN SERPL-MCNC: 21 MG/DL
CALCIUM SERPL-MCNC: 9.6 MG/DL
CHLORIDE SERPL-SCNC: 104 MMOL/L
CO2 SERPL-SCNC: 27 MMOL/L
CREAT SERPL-MCNC: 1.19 MG/DL
EGFR: 45 ML/MIN/1.73M2
EOSINOPHIL # BLD AUTO: 0.17 K/UL
EOSINOPHIL NFR BLD AUTO: 3.6 %
GLUCOSE SERPL-MCNC: 125 MG/DL
HCT VFR BLD CALC: 41.6 %
HGB BLD-MCNC: 12.2 G/DL
IMM GRANULOCYTES NFR BLD AUTO: 0.2 %
LYMPHOCYTES # BLD AUTO: 1.62 K/UL
LYMPHOCYTES NFR BLD AUTO: 33.9 %
MAN DIFF?: NORMAL
MCHC RBC-ENTMCNC: 26.6 PG
MCHC RBC-ENTMCNC: 29.3 GM/DL
MCV RBC AUTO: 90.8 FL
MONOCYTES # BLD AUTO: 0.59 K/UL
MONOCYTES NFR BLD AUTO: 12.3 %
NEUTROPHILS # BLD AUTO: 2.35 K/UL
NEUTROPHILS NFR BLD AUTO: 49.2 %
PLATELET # BLD AUTO: 272 K/UL
POTASSIUM SERPL-SCNC: 4.6 MMOL/L
PROT SERPL-MCNC: 7.3 G/DL
RBC # BLD: 4.58 M/UL
RBC # FLD: 15.9 %
SODIUM SERPL-SCNC: 143 MMOL/L
WBC # FLD AUTO: 4.78 K/UL

## 2022-08-19 PROCEDURE — 99214 OFFICE O/P EST MOD 30 MIN: CPT

## 2022-08-19 PROCEDURE — 93925 LOWER EXTREMITY STUDY: CPT

## 2022-08-19 NOTE — PHYSICAL EXAM
[JVD] : no jugular venous distention  [Normal Breath Sounds] : Normal breath sounds [Normal Heart Sounds] : normal heart sounds [2+] : left 2+ [Ankle Swelling (On Exam)] : not present

## 2022-08-19 NOTE — ASSESSMENT
[FreeTextEntry1] : Patient with severe peripheral vascular disease.  Status post lateral SFA stent placement.  Right popliteal stent with severe IntraStent stenosis.  Patient needs right leg angiogram and right intervention.  This was discussed with the patient.\par \par Continue aspirin and Plavix.\par \par

## 2022-08-19 NOTE — HISTORY OF PRESENT ILLNESS
[FreeTextEntry1] : Patient with severe peripheral vascular disease.  Status post bilateral SFA stent placement.  Patient with no significant rest pain or claudication symptoms.

## 2022-08-20 LAB
APTT BLD: 30.3 SEC
INR PPP: 1.12 RATIO
PT BLD: 13 SEC

## 2022-09-02 PROBLEM — I73.9 PAD (PERIPHERAL ARTERY DISEASE): Status: ACTIVE | Noted: 2020-11-17

## 2022-09-03 ENCOUNTER — LABORATORY RESULT (OUTPATIENT)
Age: 87
End: 2022-09-03

## 2022-09-06 ENCOUNTER — APPOINTMENT (OUTPATIENT)
Dept: ENDOVASCULAR SURGERY | Facility: CLINIC | Age: 87
End: 2022-09-06

## 2022-09-06 ENCOUNTER — RESULT REVIEW (OUTPATIENT)
Age: 87
End: 2022-09-06

## 2022-09-06 ENCOUNTER — LABORATORY RESULT (OUTPATIENT)
Age: 87
End: 2022-09-06

## 2022-09-06 ENCOUNTER — NON-APPOINTMENT (OUTPATIENT)
Age: 87
End: 2022-09-06

## 2022-09-06 VITALS
HEIGHT: 61 IN | RESPIRATION RATE: 18 BRPM | SYSTOLIC BLOOD PRESSURE: 145 MMHG | HEART RATE: 73 BPM | TEMPERATURE: 97.2 F | BODY MASS INDEX: 31.91 KG/M2 | WEIGHT: 169 LBS | DIASTOLIC BLOOD PRESSURE: 62 MMHG

## 2022-09-06 DIAGNOSIS — I73.9 PERIPHERAL VASCULAR DISEASE, UNSPECIFIED: ICD-10-CM

## 2022-09-06 PROCEDURE — 75625 CONTRAST EXAM ABDOMINL AORTA: CPT

## 2022-09-06 PROCEDURE — 37225Z: CUSTOM | Mod: RT

## 2022-09-06 RX ORDER — FUROSEMIDE 20 MG/1
20 TABLET ORAL
Refills: 0 | Status: ACTIVE | COMMUNITY

## 2022-09-06 RX ORDER — CARVEDILOL 6.25 MG/1
6.25 TABLET, FILM COATED ORAL
Refills: 0 | Status: ACTIVE | COMMUNITY

## 2022-09-06 RX ORDER — HYDROCHLOROTHIAZIDE 12.5 MG/1
12.5 CAPSULE ORAL
Refills: 0 | Status: DISCONTINUED | COMMUNITY
End: 2022-09-06

## 2022-09-06 NOTE — ASSESSMENT
[Arterial/Venous Disease] : arterial/venous disease [FreeTextEntry1] : Patient with severe peripheral vascular disease.  Status post lateral SFA stent placement.  Right popliteal stent with severe IntraStent stenosis.  Plan for  right leg angiogram and right intervention\par \par \par \par

## 2022-09-06 NOTE — PHYSICAL EXAM
[Normal Breath Sounds] : Normal breath sounds [Normal Heart Sounds] : normal heart sounds [2+] : left 2+ [0] : left 0 [No Rash or Lesion] : No rash or lesion [Alert] : alert [Calm] : calm [JVD] : no jugular venous distention  [Ankle Swelling (On Exam)] : not present [Varicose Veins Of Lower Extremities] : not present [] : not present [Skin Ulcer] : no ulcer [de-identified] : appears well

## 2022-09-06 NOTE — HISTORY OF PRESENT ILLNESS
[FreeTextEntry1] : accompanied by friend Surya [phone]\par fully vaccinated for covid \par covid not detected 9/3/2022\par Cr 1.19 8/19/2022\par alert and oriented\par no reported falls\par took all meds except losartan this morning\par took aspirin/plavix [FreeTextEntry5] : 1000 last night [FreeTextEntry6] : Dr. Chapman

## 2022-09-23 ENCOUNTER — APPOINTMENT (OUTPATIENT)
Dept: VASCULAR SURGERY | Facility: CLINIC | Age: 87
End: 2022-09-23

## 2022-09-23 VITALS
BODY MASS INDEX: 31.91 KG/M2 | HEIGHT: 61 IN | DIASTOLIC BLOOD PRESSURE: 72 MMHG | HEART RATE: 73 BPM | WEIGHT: 169 LBS | SYSTOLIC BLOOD PRESSURE: 185 MMHG

## 2022-09-23 PROCEDURE — 93926 LOWER EXTREMITY STUDY: CPT

## 2022-09-23 PROCEDURE — 99214 OFFICE O/P EST MOD 30 MIN: CPT

## 2022-09-23 NOTE — HISTORY OF PRESENT ILLNESS
[FreeTextEntry1] : 85yo female with history of dm, hld, pad s/p b/l pop stents presents for follow up status post recent angiogram for severe right lower extremity in-stent stenosis.  Patient denies any history of rest pain or open wounds of the lower extremities

## 2022-09-23 NOTE — PHYSICAL EXAM
[Ankle Swelling (On Exam)] : present [Ankle Swelling On The Right] : of the right ankle [Ankle Swelling On The Left] : moderate [No Rash or Lesion] : No rash or lesion [Alert] : alert [Calm] : calm [JVD] : no jugular venous distention  [Skin Ulcer] : no ulcer [de-identified] : Appears well [de-identified] : Right foot is pink and warm

## 2022-09-23 NOTE — ASSESSMENT
[FreeTextEntry1] : \par 85yo female with history of dm, hld, pad s/p b/l pop stents presents for follow up status post recent angiogram for severe right lower extremity in-stent stenosis\par \par Repeat duplex shows widely patent right SFA to pop stent\par \par Patient currently asymptomatic we will continue with aspirin Plavix and Lipitor and repeat duplex in 3 months of bilateral SFA stents

## 2022-12-09 ENCOUNTER — APPOINTMENT (OUTPATIENT)
Dept: VASCULAR SURGERY | Facility: CLINIC | Age: 87
End: 2022-12-09

## 2022-12-09 VITALS
DIASTOLIC BLOOD PRESSURE: 63 MMHG | SYSTOLIC BLOOD PRESSURE: 181 MMHG | HEART RATE: 87 BPM | BODY MASS INDEX: 31.91 KG/M2 | WEIGHT: 169 LBS | HEIGHT: 61 IN

## 2022-12-09 PROCEDURE — 99214 OFFICE O/P EST MOD 30 MIN: CPT

## 2022-12-09 PROCEDURE — 93925 LOWER EXTREMITY STUDY: CPT

## 2022-12-09 NOTE — HISTORY OF PRESENT ILLNESS
[FreeTextEntry1] : 87yo female with history of dm, hld, pad s/p b/l pop stents presents for follow up status post recent angiogram for severe right lower extremity in-stent stenosis.  Patient denies any history of rest pain or open wounds of the lower extremities

## 2022-12-09 NOTE — PHYSICAL EXAM
[JVD] : no jugular venous distention  [Ankle Swelling (On Exam)] : present [Ankle Swelling On The Right] : of the right ankle [Ankle Swelling On The Left] : moderate [No Rash or Lesion] : No rash or lesion [Skin Ulcer] : no ulcer [Alert] : alert [Calm] : calm [de-identified] : Appears well [de-identified] : Right foot is pink and warm

## 2023-05-12 ENCOUNTER — APPOINTMENT (OUTPATIENT)
Dept: VASCULAR SURGERY | Facility: CLINIC | Age: 88
End: 2023-05-12
Payer: MEDICARE

## 2023-05-12 VITALS
HEIGHT: 61 IN | BODY MASS INDEX: 31.15 KG/M2 | WEIGHT: 165 LBS | DIASTOLIC BLOOD PRESSURE: 68 MMHG | HEART RATE: 64 BPM | SYSTOLIC BLOOD PRESSURE: 159 MMHG

## 2023-05-12 PROCEDURE — 93925 LOWER EXTREMITY STUDY: CPT

## 2023-05-12 PROCEDURE — 99214 OFFICE O/P EST MOD 30 MIN: CPT

## 2023-05-12 NOTE — PHYSICAL EXAM
[Ankle Swelling (On Exam)] : present [Ankle Swelling On The Right] : of the right ankle [Ankle Swelling On The Left] : moderate [No Rash or Lesion] : No rash or lesion [Alert] : alert [Calm] : calm [JVD] : no jugular venous distention  [Varicose Veins Of Lower Extremities] : not present [] : not present [Skin Ulcer] : no ulcer [de-identified] : Appears well [de-identified] : Right foot is pink and warm

## 2023-05-12 NOTE — ASSESSMENT
[FreeTextEntry1] : \par 87 year old female with history significant for DM, hyperlipidemia, and peripheral arterial disease s/p bilateral SFA and popliteal stents \par \par In the office today, patient underwent duplex which demonstrates bilateral patent SFA and popliteal stents with mild stenosis of the native vessels.\par \par Continue with aspirin Plavix and Lipitor\par Follow-up in 3 months with repeat duplex

## 2023-05-12 NOTE — HISTORY OF PRESENT ILLNESS
[FreeTextEntry1] : Patient is an 87 year old female with history significant for DM, hyperlipidemia, and peripheral arterial disease s/p bilateral SFA and popliteal stents presents for follow up.  Patient denies any history of rest pain or open wounds of the lower extremities

## 2023-08-18 ENCOUNTER — APPOINTMENT (OUTPATIENT)
Dept: VASCULAR SURGERY | Facility: CLINIC | Age: 88
End: 2023-08-18

## 2023-09-22 ENCOUNTER — APPOINTMENT (OUTPATIENT)
Age: 88
End: 2023-09-22
